# Patient Record
Sex: FEMALE | Race: BLACK OR AFRICAN AMERICAN
[De-identification: names, ages, dates, MRNs, and addresses within clinical notes are randomized per-mention and may not be internally consistent; named-entity substitution may affect disease eponyms.]

---

## 2017-01-04 ENCOUNTER — MEDICATION RENEWAL (OUTPATIENT)
Age: 82
End: 2017-01-04

## 2017-01-10 DIAGNOSIS — Z74.01 BED CONFINEMENT STATUS: ICD-10-CM

## 2017-01-10 DIAGNOSIS — Z78.9 OTHER SPECIFIED HEALTH STATUS: ICD-10-CM

## 2017-02-23 ENCOUNTER — MEDICATION RENEWAL (OUTPATIENT)
Age: 82
End: 2017-02-23

## 2017-02-27 ENCOUNTER — CLINICAL ADVICE (OUTPATIENT)
Age: 82
End: 2017-02-27

## 2017-02-27 ENCOUNTER — MEDICATION RENEWAL (OUTPATIENT)
Age: 82
End: 2017-02-27

## 2017-03-03 ENCOUNTER — APPOINTMENT (OUTPATIENT)
Dept: HOME HEALTH SERVICES | Facility: HOME HEALTH | Age: 82
End: 2017-03-03

## 2017-03-03 VITALS — OXYGEN SATURATION: 99 % | RESPIRATION RATE: 12 BRPM | TEMPERATURE: 97.7 F | HEART RATE: 89 BPM

## 2017-03-03 VITALS — SYSTOLIC BLOOD PRESSURE: 118 MMHG | DIASTOLIC BLOOD PRESSURE: 65 MMHG

## 2017-03-03 RX ORDER — LEVOFLOXACIN 500 MG/1
500 TABLET, FILM COATED ORAL DAILY
Qty: 5 | Refills: 0 | Status: DISCONTINUED | COMMUNITY
Start: 2017-01-04 | End: 2017-03-03

## 2017-03-03 RX ORDER — GUAIFENESIN 100 MG/5ML
100 SOLUTION ORAL
Qty: 120 | Refills: 0 | Status: DISCONTINUED | COMMUNITY
Start: 2017-01-04 | End: 2017-03-03

## 2017-03-03 RX ORDER — GENTAMICIN SULFATE 3 MG/ML
0.3 SOLUTION OPHTHALMIC
Qty: 1 | Refills: 0 | Status: DISCONTINUED | COMMUNITY
Start: 2017-01-04 | End: 2017-03-03

## 2017-04-21 ENCOUNTER — CLINICAL ADVICE (OUTPATIENT)
Age: 82
End: 2017-04-21

## 2017-04-21 ENCOUNTER — RX RENEWAL (OUTPATIENT)
Age: 82
End: 2017-04-21

## 2017-04-28 ENCOUNTER — APPOINTMENT (OUTPATIENT)
Dept: HOME HEALTH SERVICES | Facility: HOME HEALTH | Age: 82
End: 2017-04-28

## 2017-05-03 ENCOUNTER — INPATIENT (INPATIENT)
Facility: HOSPITAL | Age: 82
LOS: 0 days | Discharge: HOSPICE | End: 2017-05-04
Attending: INTERNAL MEDICINE | Admitting: INTERNAL MEDICINE

## 2017-05-03 VITALS
HEART RATE: 85 BPM | DIASTOLIC BLOOD PRESSURE: 54 MMHG | RESPIRATION RATE: 16 BRPM | OXYGEN SATURATION: 98 % | SYSTOLIC BLOOD PRESSURE: 110 MMHG | TEMPERATURE: 99 F

## 2017-05-03 LAB
BLD GP AB SCN SERPL QL: SIGNIFICANT CHANGE UP
HCT VFR BLD CALC: 19.2 % — CRITICAL LOW (ref 34.5–45)
HGB BLD-MCNC: 5.7 G/DL — CRITICAL LOW (ref 11.5–15.5)
MCHC RBC-ENTMCNC: 15.1 PG — LOW (ref 27–34)
MCHC RBC-ENTMCNC: 29.5 GM/DL — LOW (ref 32–36)
MCV RBC AUTO: 51 FL — LOW (ref 80–100)
PLATELET # BLD AUTO: 247 K/UL — SIGNIFICANT CHANGE UP (ref 150–400)
RBC # BLD: 3.78 M/UL — LOW (ref 3.8–5.2)
RBC # FLD: 15.8 % — HIGH (ref 11–15)
WBC # BLD: 5 K/UL — SIGNIFICANT CHANGE UP (ref 3.8–10.5)
WBC # FLD AUTO: 5 K/UL — SIGNIFICANT CHANGE UP (ref 3.8–10.5)

## 2017-05-03 RX ORDER — DOCUSATE SODIUM 100 MG
100 CAPSULE ORAL THREE TIMES A DAY
Qty: 0 | Refills: 0 | Status: DISCONTINUED | OUTPATIENT
Start: 2017-05-03 | End: 2017-05-04

## 2017-05-03 RX ORDER — OXYCODONE HYDROCHLORIDE 5 MG/1
5 TABLET ORAL EVERY 6 HOURS
Qty: 0 | Refills: 0 | Status: DISCONTINUED | OUTPATIENT
Start: 2017-05-03 | End: 2017-05-04

## 2017-05-03 RX ORDER — ACETAMINOPHEN 500 MG
650 TABLET ORAL EVERY 6 HOURS
Qty: 0 | Refills: 0 | Status: DISCONTINUED | OUTPATIENT
Start: 2017-05-03 | End: 2017-05-04

## 2017-05-03 RX ORDER — SENNA PLUS 8.6 MG/1
0 TABLET ORAL
Qty: 0 | Refills: 0 | COMMUNITY

## 2017-05-03 RX ORDER — OXYCODONE HYDROCHLORIDE 5 MG/1
10 TABLET ORAL EVERY 6 HOURS
Qty: 0 | Refills: 0 | Status: DISCONTINUED | OUTPATIENT
Start: 2017-05-03 | End: 2017-05-04

## 2017-05-03 RX ORDER — SENNA PLUS 8.6 MG/1
2 TABLET ORAL AT BEDTIME
Qty: 0 | Refills: 0 | Status: DISCONTINUED | OUTPATIENT
Start: 2017-05-03 | End: 2017-05-04

## 2017-05-03 RX ORDER — OXYCODONE HYDROCHLORIDE 5 MG/1
5 TABLET ORAL
Qty: 0 | Refills: 0 | COMMUNITY

## 2017-05-03 RX ADMIN — SENNA PLUS 2 TABLET(S): 8.6 TABLET ORAL at 23:29

## 2017-05-03 RX ADMIN — Medication 100 MILLIGRAM(S): at 23:29

## 2017-05-04 ENCOUNTER — APPOINTMENT (OUTPATIENT)
Dept: HOME HEALTH SERVICES | Facility: HOME HEALTH | Age: 82
End: 2017-05-04

## 2017-05-04 VITALS — DIASTOLIC BLOOD PRESSURE: 62 MMHG | SYSTOLIC BLOOD PRESSURE: 164 MMHG

## 2017-05-04 DIAGNOSIS — D50.0 IRON DEFICIENCY ANEMIA SECONDARY TO BLOOD LOSS (CHRONIC): ICD-10-CM

## 2017-05-04 DIAGNOSIS — C18.9 MALIGNANT NEOPLASM OF COLON, UNSPECIFIED: ICD-10-CM

## 2017-05-04 DIAGNOSIS — R19.7 DIARRHEA, UNSPECIFIED: ICD-10-CM

## 2017-05-04 DIAGNOSIS — K92.2 GASTROINTESTINAL HEMORRHAGE, UNSPECIFIED: ICD-10-CM

## 2017-05-04 DIAGNOSIS — I10 ESSENTIAL (PRIMARY) HYPERTENSION: ICD-10-CM

## 2017-05-04 LAB
HCT VFR BLD CALC: 31.3 % — LOW (ref 34.5–45)
HGB BLD-MCNC: 10.3 G/DL — LOW (ref 11.5–15.5)
MCHC RBC-ENTMCNC: 20.3 PG — LOW (ref 27–34)
MCHC RBC-ENTMCNC: 32.8 GM/DL — SIGNIFICANT CHANGE UP (ref 32–36)
MCV RBC AUTO: 61.8 FL — LOW (ref 80–100)
PLATELET # BLD AUTO: 225 K/UL — SIGNIFICANT CHANGE UP (ref 150–400)
RBC # BLD: 5.06 M/UL — SIGNIFICANT CHANGE UP (ref 3.8–5.2)
RBC # FLD: 26.7 % — HIGH (ref 11–15)
WBC # BLD: 6 K/UL — SIGNIFICANT CHANGE UP (ref 3.8–10.5)
WBC # FLD AUTO: 6 K/UL — SIGNIFICANT CHANGE UP (ref 3.8–10.5)

## 2017-05-04 RX ORDER — DOCUSATE SODIUM 100 MG
1 CAPSULE ORAL
Qty: 0 | Refills: 0 | COMMUNITY
Start: 2017-05-04

## 2017-05-04 RX ORDER — ACETAMINOPHEN 500 MG
2 TABLET ORAL
Qty: 0 | Refills: 0 | COMMUNITY
Start: 2017-05-04

## 2017-05-04 RX ADMIN — Medication 100 MILLIGRAM(S): at 07:08

## 2017-05-04 NOTE — H&P ADULT. - ASSESSMENT
89 years old female on home hospice with colon cancer and dementia and h/o rt lg bowel obstruction 12/2016. Has been having bright red blood with BMs at home in recent weeks.  CBC at [facility] revealed hgb 5 and pt was transferred to hospice [facility] for PRBC transfusion.  Pt received 2 Units PRBC overnight and tolerated well. She is only taking glucerna PO as per her baseline.  Spoke with yue Wilson who, with granddaughter Latasha, is awaiting pt d/c back home with hospice [facility]. Discussed GOC with yue Wilson. No further GI workup planned by family . they wish to focus on symptom management.

## 2017-05-04 NOTE — DISCHARGE NOTE ADULT - PATIENT PORTAL LINK FT
“You can access the [employer] Patient Portal, offered by [facility], by registering with the following website: [email or web address]”

## 2017-05-04 NOTE — DISCHARGE NOTE ADULT - CARE PLAN
Principal Discharge DX:	Malignant neoplasm of colon, unspecified part of colon  Goal:	You will return home with improved symptoms after blood transfusion,  Instructions for follow-up, activity and diet:	Dr. Monahan's office and Malorie Dorantes, the , have been made aware that you are coming home tonight.  [facility] is aware you are coming home tonight and they will call you tonight to see if you got in ok and to see if you need anything.  Secondary Diagnosis:	Anemia due to blood loss  Goal:	You will return home with improved symptoms after blood transfusion,  Secondary Diagnosis:	Diarrhea, unspecified type  Goal:	you will have 2 or less loose BM per day  Instructions for follow-up, activity and diet:	Do not take your colace or senna if you are having 3 or more loose BM per day.  Glucerna can agitate the gut and also contribute to diarrhea.  Try to include some other foods in the diet if you can tolerate it like bananas and toast and applesauce.  If you have more than 3 loose BM per day call HOSPICE

## 2017-05-04 NOTE — H&P ADULT. - PROBLEM SELECTOR PLAN 2
symptomatic with weakness and mild dyspnea  Has had good improvement in HGB since PRBC tx x 2 U overnight

## 2017-05-04 NOTE — DISCHARGE NOTE ADULT - MEDICATION SUMMARY - MEDICATIONS TO TAKE
I will START or STAY ON the medications listed below when I get home from the hospital:    oxyCODONE 5 mg oral tablet  -- 5 milligram(s) by mouth   -- Indication: For pain    acetaminophen 325 mg oral tablet  -- 2 tab(s) by mouth every 6 hours, As needed, For Temp greater than 38 C (100.4 F)  -- Indication: For fever    docusate sodium 100 mg oral capsule  -- 1 cap(s) by mouth 3 times a day  -- Indication: For bowel regimen. Do not take this med if you have loose stools or more than 3 BM per day    senna oral tablet  --  by mouth   -- Indication: For bowel regimen. Do not take this med if you have loose stools or more than 3 BM per day

## 2017-05-04 NOTE — H&P ADULT. - HISTORY OF PRESENT ILLNESS
89 years old female on home hospice with colon cancer and dementia and h/o rt lg bowel obstruction 12/2016. Has been having bright red blood with BMs at home in recent weeks.  CBC at [facility] revealed hgb 5 and pt was transferred to hospice [facility] for PRBC transfusion.

## 2017-05-04 NOTE — DISCHARGE NOTE ADULT - HOSPITAL COURSE
89 years old female on home hospice with colon cancer and dementia and h/o rt lg bowel obstruction 12/2016. Has been having bright red blood with BMs at home in recent weeks.  CBC at home revealed hgb 5 and pt was transferred to hospice [facility] for PRBC transfusion.  Pt received 2 Units PRBC overnight and tolerated well. She is only taking glucerna PO as per her baseline.  Spoke with dtr Katie who, with granddaughter Latasha, is awaiting pt d/c back home with hospice [facility]. Discussed GOC with dtr Katie. No further GI workup planned by family . They wish to focus on symptom management.  Pt had 2 loose (not watery) BM today. she did have senna and colace today and last night. she is also only taking Glucerna PO, as her dtr reports she also does at home.  No BRBPR today

## 2017-05-04 NOTE — CHART NOTE - NSCHARTNOTEFT_GEN_A_CORE
Hospitalist Medicine PA  Requested by Rn to obtain the blood transfusion consent. Explained to patient's daughter and granddaughter at bedside regarding the need for blood transfusion. The risk and benefits were discussed, the risk including fever, chills, lower back pain, allergic reaction, and even death were explained. Verbalizes the understanding and consent was obtained. RN aware. Hospitalist Medicine PA  Requested by Rn to obtain the blood transfusion consent. Explained to patient's daughter and granddaughter at bedside regarding the need for blood transfusion. The risk and benefits were discussed, the risk including fever, chills, lower back pain, allergic reaction, and even death were explained. Verbalizes the understanding and consent was obtained at 22:00. RN aware.

## 2017-05-04 NOTE — H&P ADULT. - PROBLEM SELECTOR PLAN 5
2 loose (not watery) BM today   hold senekot and colace  likely secondary to glucerna+disease+laxative regimen  not associated with abd pain

## 2017-05-04 NOTE — DISCHARGE NOTE ADULT - PLAN OF CARE
You will return home with improved symptoms after blood transfusion, Dr. Monahan's office and Malorie Dorantes, the , have been made aware that you are coming home tonight.  [facility] is aware you are coming home tonight and they will call you tonight to see if you got in ok and to see if you need anything. you will have 2 or less loose BM per day Do not take your colace or senna if you are having 3 or more loose BM per day.  Glucerna can agitate the gut and also contribute to diarrhea.  Try to include some other foods in the diet if you can tolerate it like bananas and toast and applesauce.  If you have more than 3 loose BM per day call HOSPICE

## 2017-05-04 NOTE — DISCHARGE NOTE ADULT - CARE PROVIDER_API CALL
Jory Monahan), Internal Medicine  [address] [place] [address]  Phone: [phone]  Fax: [phone]

## 2017-05-05 ENCOUNTER — APPOINTMENT (OUTPATIENT)
Dept: HOME HEALTH SERVICES | Facility: HOME HEALTH | Age: 82
End: 2017-05-05

## 2017-05-05 VITALS
OXYGEN SATURATION: 98 % | SYSTOLIC BLOOD PRESSURE: 145 MMHG | RESPIRATION RATE: 12 BRPM | DIASTOLIC BLOOD PRESSURE: 60 MMHG | HEART RATE: 78 BPM

## 2017-05-05 DIAGNOSIS — Z87.898 PERSONAL HISTORY OF OTHER SPECIFIED CONDITIONS: ICD-10-CM

## 2017-05-05 DIAGNOSIS — K59.09 OTHER CONSTIPATION: ICD-10-CM

## 2017-05-05 DIAGNOSIS — K92.2 GASTROINTESTINAL HEMORRHAGE, UNSPECIFIED: ICD-10-CM

## 2017-05-05 DIAGNOSIS — Z23 ENCOUNTER FOR IMMUNIZATION: ICD-10-CM

## 2017-05-08 DIAGNOSIS — K92.2 GASTROINTESTINAL HEMORRHAGE, UNSPECIFIED: ICD-10-CM

## 2017-05-08 DIAGNOSIS — R19.7 DIARRHEA, UNSPECIFIED: ICD-10-CM

## 2017-05-08 DIAGNOSIS — C18.9 MALIGNANT NEOPLASM OF COLON, UNSPECIFIED: ICD-10-CM

## 2017-05-08 DIAGNOSIS — I10 ESSENTIAL (PRIMARY) HYPERTENSION: ICD-10-CM

## 2017-05-08 DIAGNOSIS — K62.5 HEMORRHAGE OF ANUS AND RECTUM: ICD-10-CM

## 2017-05-08 DIAGNOSIS — D50.0 IRON DEFICIENCY ANEMIA SECONDARY TO BLOOD LOSS (CHRONIC): ICD-10-CM

## 2017-05-15 ENCOUNTER — MEDICATION RENEWAL (OUTPATIENT)
Age: 82
End: 2017-05-15

## 2017-07-05 ENCOUNTER — APPOINTMENT (OUTPATIENT)
Dept: HOME HEALTH SERVICES | Facility: HOME HEALTH | Age: 82
End: 2017-07-05

## 2017-07-05 VITALS
DIASTOLIC BLOOD PRESSURE: 50 MMHG | HEART RATE: 75 BPM | OXYGEN SATURATION: 97 % | TEMPERATURE: 97 F | SYSTOLIC BLOOD PRESSURE: 130 MMHG | RESPIRATION RATE: 12 BRPM

## 2017-07-05 DIAGNOSIS — J06.9 ACUTE UPPER RESPIRATORY INFECTION, UNSPECIFIED: ICD-10-CM

## 2017-07-05 DIAGNOSIS — R60.0 LOCALIZED EDEMA: ICD-10-CM

## 2017-07-05 DIAGNOSIS — E11.9 TYPE 2 DIABETES MELLITUS W/OUT COMPLICATIONS: ICD-10-CM

## 2017-07-05 DIAGNOSIS — I10 ESSENTIAL (PRIMARY) HYPERTENSION: ICD-10-CM

## 2017-07-05 DIAGNOSIS — H10.33 UNSPECIFIED ACUTE CONJUNCTIVITIS, BILATERAL: ICD-10-CM

## 2017-07-05 RX ORDER — SENNOSIDES 8.6 MG TABLETS 8.6 MG/1
8.6 TABLET ORAL
Qty: 60 | Refills: 2 | Status: ACTIVE | COMMUNITY
Start: 2017-04-21

## 2017-07-05 RX ORDER — ACETAMINOPHEN 325 MG/1
325 TABLET, FILM COATED ORAL
Qty: 1 | Refills: 6 | Status: ACTIVE | COMMUNITY
Start: 2017-05-05

## 2017-07-05 RX ORDER — ENEMA 19; 7 G/133ML; G/133ML
7-19 ENEMA RECTAL DAILY
Qty: 14 | Refills: 5 | Status: ACTIVE | COMMUNITY
Start: 2017-05-15

## 2017-07-05 RX ORDER — LACTULOSE 10 G/15ML
10 SOLUTION ORAL
Qty: 960 | Refills: 2 | Status: ACTIVE | COMMUNITY
Start: 2017-04-25

## 2017-07-05 RX ORDER — DOCUSATE SODIUM 100 MG/1
100 CAPSULE ORAL 3 TIMES DAILY
Qty: 90 | Refills: 5 | Status: ACTIVE | COMMUNITY
Start: 2017-05-05

## 2017-11-08 ENCOUNTER — APPOINTMENT (OUTPATIENT)
Dept: HOME HEALTH SERVICES | Facility: HOME HEALTH | Age: 82
End: 2017-11-08
Payer: MEDICARE

## 2017-11-08 VITALS
DIASTOLIC BLOOD PRESSURE: 60 MMHG | SYSTOLIC BLOOD PRESSURE: 130 MMHG | RESPIRATION RATE: 12 BRPM | HEART RATE: 70 BPM | OXYGEN SATURATION: 97 % | TEMPERATURE: 97.1 F

## 2017-11-08 DIAGNOSIS — H61.23 IMPACTED CERUMEN, BILATERAL: ICD-10-CM

## 2017-11-08 DIAGNOSIS — C18.9 MALIGNANT NEOPLASM OF COLON, UNSPECIFIED: ICD-10-CM

## 2017-11-08 DIAGNOSIS — E46 UNSPECIFIED PROTEIN-CALORIE MALNUTRITION: ICD-10-CM

## 2017-11-08 DIAGNOSIS — F03.90 UNSPECIFIED DEMENTIA W/OUT BEHAVIORAL DISTURBANCE: ICD-10-CM

## 2017-11-08 DIAGNOSIS — R53.2 FUNCTIONAL QUADRIPLEGIA: ICD-10-CM

## 2017-11-08 DIAGNOSIS — Z63.8 OTHER SPECIFIED PROBLEMS RELATED TO PRIMARY SUPPORT GROUP: ICD-10-CM

## 2017-11-08 PROCEDURE — 69210 REMOVE IMPACTED EAR WAX UNI: CPT

## 2017-11-08 PROCEDURE — 99349 HOME/RES VST EST MOD MDM 40: CPT | Mod: 25,GV

## 2017-11-08 SDOH — SOCIAL STABILITY - SOCIAL INSECURITY: OTHER SPECIFIED PROBLEMS RELATED TO PRIMARY SUPPORT GROUP: Z63.8

## 2017-11-10 ENCOUNTER — APPOINTMENT (OUTPATIENT)
Dept: HOME HEALTH SERVICES | Facility: HOME HEALTH | Age: 82
End: 2017-11-10

## 2017-11-10 DIAGNOSIS — Z51.5 ENCOUNTER FOR PALLIATIVE CARE: ICD-10-CM

## 2018-12-19 NOTE — H&P ADULT. - FAMILY HISTORY
Detail Level: Detailed
Add 64509 Cpt? (Important Note: In 2017 The Use Of 57977 Is Being Tracked By Cms To Determine Future Global Period Reimbursement For Global Periods): yes
No pertinent family history in first degree relatives

## 2019-08-26 NOTE — PATIENT PROFILE ADULT. - ATTEMPT TO OOB
- Continue regular diet.  - Increase ambulation.  - Continue PCEA for pain.  - F/u AM CBC    Kyaw Virgen PGY1 no